# Patient Record
Sex: MALE | Race: WHITE | NOT HISPANIC OR LATINO
[De-identification: names, ages, dates, MRNs, and addresses within clinical notes are randomized per-mention and may not be internally consistent; named-entity substitution may affect disease eponyms.]

---
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
## 2021-08-24 ENCOUNTER — TRANSCRIPTION ENCOUNTER (OUTPATIENT)
Age: 21
End: 2021-08-24

## 2022-01-11 ENCOUNTER — TRANSCRIPTION ENCOUNTER (OUTPATIENT)
Age: 22
End: 2022-01-11

## 2022-01-12 ENCOUNTER — OUTPATIENT (OUTPATIENT)
Dept: OUTPATIENT SERVICES | Facility: HOSPITAL | Age: 22
LOS: 1 days | Discharge: HOME | End: 2022-01-12

## 2022-01-12 DEVICE — ANCHOR GUIDE Q FIX 1.8 MINI: Type: IMPLANTABLE DEVICE | Site: RIGHT | Status: FUNCTIONAL

## 2022-01-12 DEVICE — IMP Q FIX 1.8 MINI: Type: IMPLANTABLE DEVICE | Site: RIGHT | Status: FUNCTIONAL

## 2022-02-25 ENCOUNTER — OUTPATIENT (OUTPATIENT)
Dept: OUTPATIENT SERVICES | Facility: HOSPITAL | Age: 22
LOS: 1 days | End: 2022-02-25
Payer: COMMERCIAL

## 2022-02-25 PROCEDURE — 73501 X-RAY EXAM HIP UNI 1 VIEW: CPT | Mod: 26,RT

## 2022-02-25 PROCEDURE — 73501 X-RAY EXAM HIP UNI 1 VIEW: CPT

## 2022-06-16 ENCOUNTER — NON-APPOINTMENT (OUTPATIENT)
Age: 22
End: 2022-06-16

## 2022-12-15 ENCOUNTER — APPOINTMENT (OUTPATIENT)
Dept: ORTHOPEDIC SURGERY | Facility: CLINIC | Age: 22
End: 2022-12-15

## 2022-12-15 VITALS — BODY MASS INDEX: 23.52 KG/M2 | HEIGHT: 71 IN | RESPIRATION RATE: 16 BRPM | WEIGHT: 168 LBS

## 2022-12-15 DIAGNOSIS — Z78.9 OTHER SPECIFIED HEALTH STATUS: ICD-10-CM

## 2022-12-15 DIAGNOSIS — S69.82XD OTHER SPECIFIED INJURIES OF LEFT WRIST, HAND AND FINGER(S), SUBSEQUENT ENCOUNTER: ICD-10-CM

## 2022-12-15 PROBLEM — Z00.00 ENCOUNTER FOR PREVENTIVE HEALTH EXAMINATION: Status: ACTIVE | Noted: 2022-12-15

## 2022-12-15 PROCEDURE — 73110 X-RAY EXAM OF WRIST: CPT | Mod: 50

## 2022-12-15 PROCEDURE — 99205 OFFICE O/P NEW HI 60 MIN: CPT

## 2022-12-15 PROCEDURE — 76882 US LMTD JT/FCL EVL NVASC XTR: CPT

## 2023-03-20 ENCOUNTER — NON-APPOINTMENT (OUTPATIENT)
Age: 23
End: 2023-03-20

## 2023-07-28 ENCOUNTER — RESULT REVIEW (OUTPATIENT)
Age: 23
End: 2023-07-28

## 2023-07-28 ENCOUNTER — OUTPATIENT (OUTPATIENT)
Dept: OUTPATIENT SERVICES | Facility: HOSPITAL | Age: 23
LOS: 1 days | End: 2023-07-28
Payer: COMMERCIAL

## 2023-07-28 ENCOUNTER — APPOINTMENT (OUTPATIENT)
Dept: ORTHOPEDIC SURGERY | Facility: CLINIC | Age: 23
End: 2023-07-28
Payer: COMMERCIAL

## 2023-07-28 VITALS
HEART RATE: 58 BPM | HEIGHT: 71 IN | DIASTOLIC BLOOD PRESSURE: 70 MMHG | SYSTOLIC BLOOD PRESSURE: 115 MMHG | WEIGHT: 165 LBS | BODY MASS INDEX: 23.1 KG/M2 | OXYGEN SATURATION: 99 %

## 2023-07-28 DIAGNOSIS — M25.862 OTHER SPECIFIED JOINT DISORDERS, LEFT KNEE: ICD-10-CM

## 2023-07-28 DIAGNOSIS — Z78.9 OTHER SPECIFIED HEALTH STATUS: ICD-10-CM

## 2023-07-28 DIAGNOSIS — M76.32 ILIOTIBIAL BAND SYNDROME, LEFT LEG: ICD-10-CM

## 2023-07-28 PROCEDURE — 73564 X-RAY EXAM KNEE 4 OR MORE: CPT

## 2023-07-28 PROCEDURE — 73564 X-RAY EXAM KNEE 4 OR MORE: CPT | Mod: 26,50

## 2023-07-28 PROCEDURE — 99213 OFFICE O/P EST LOW 20 MIN: CPT

## 2023-07-28 NOTE — ASSESSMENT
[FreeTextEntry1] : JOSH DELAROSA is a 22 year old male with left knee pain.\par I discussed with the patient that their symptoms, signs, and imaging are most consistent with ITB tightness, patellofemoral dysfunction.\par We reviewed the natural history of this condition and treatment options.\par We agreed on the following plan:\par \par XR taken and reviewed with patient today.\par Activity modification: low impact aerobic activity (stationary bike, elliptical, swimming)\par Recommend 150 min per week of moderate intensity aerobic activity \par Start Home Exercises for patellofemoral and ITB conditioning. Demonstration, resistance bands and handout provided. \par Emphasized importance of daily stretching.\par Physical therapy. Referral provided.\par Medication: Diclofenac gel prn.\par Advanced imaging: MRI per patient request to evaluate for possible meniscus tear.\par Follow up after imaging.

## 2023-07-28 NOTE — HISTORY OF PRESENT ILLNESS
[de-identified] : JOSH DELAROSA is a 22 year old male who presents with left knee pain.\par States the onset of pain was spprox 1 month\par No antecedent trauma or injury.\par Started Muy [place] and Harshilu Harshilraphaelu Mar 2023 does recall certain maneuvers that may have twisted his knee over a few weeks.\par Has been having feeling of instability and pain  \par Hx of right hip SHANNON correction and labral repair in Jan 2022\par Pain is anterior described as burning\par Pain is nonradiating.\par There is no associated swelling, stiffness, numbness, paraesthesia or weakness.\par Exacerbating factors are deep squatting and twisting\par Patient ambulates independently.\par Exercise: running, martial arts, soccer\par Has not tried PT\par Attends [employer] in [place]. Studying Film and Economics

## 2023-07-28 NOTE — PHYSICAL EXAM
[de-identified] : General: Well-nourished, well-developed, alert, and in no acute distress.\par Head: Normocephalic.\par Eyes: Pupils equal, extraocular muscles intact, normal sclera.\par Nose: No nasal discharge.\par Cardiovascular: Extremities are warm and well perfused. Distal pulses are symmetric bilaterally.\par Respiratory: No labored breathing.\par Extremities: Sensation is intact distally bilaterally. Distal pulses are symmetric bilaterally\par Lymphatic: No regional lymphadenopathy, no lymphedema\par Neurologic: No focal deficits\par Skin: Normal skin color, texture, and turgor\par Psychiatric: Normal affect \par \par MSK:\par Examination of [left] knee:\par \par Gait [non-antalgic]\par Neutral alignment\par [No pain] with double leg squat\par Slight valgus moment with single leg squat \par No effusion\par No erythema, hematoma or skin lesion\par Nontender to palpation: medial joint line, lateral joint line, medial patellar facet, lateral patellar facet, quad tendon, patellar tendon, pes, Gerdy's tubercle, tibial tuberosity, popliteal fossa, hamstrings, ITB \par No warmth\par No Baker's cyst palpable\par ROM: 0-[120]\par Patellar clicking\par \par Log roll negative\par Lachman negative\par Anterior drawer negative\par Posterior drawer negative\par Varus/valgus stress negative at 0 and 30 deg\par Charlie  negative\par Patellar grind negative\par Noble negative\par Juan Ramon positive\par Thessaly negative\par \par Examination of [right] knee:\par \par No effusion, erythema, hematoma or skin lesion\par Nontender to palpation: medial joint line, lateral joint line, medial patellar facet, lateral patellar facet, quad tendon, patellar tendon, pes, Gerdy's tubercle, tibial tuberosity, popliteal fossa, hamstrings, ITB \par No warmth\par No Baker's cyst palpable\par ROM: 0-120\par [No] patellar crepitus\par \par Log roll negative\par Lachman negative\par Anterior drawer negative\par Posterior drawer negative\par Varus/valgus stress negative at 0 and 30 deg\par Charlie  negative\par Patellar grind negative \par \par Sensation is intact to light touch over the superficial and deep peroneal nerve distributions and the posterior tibial nerve distribution. Capillary refill is less than two seconds. Posterior tibial and dorsalis pedis pulses 2+ equal bilaterally. No calf swelling or tenderness bilaterally. Strength testing shows  Hip flexion 5/5, Hip abduction 5/5, Hip abduction 5/5, Knee Extension 5/5, Knee Flexion 5/5, dorsiflexion 5/5, plantar flexion 5/5, EHL 5/5\par Reflexes: Patellar 2+, Achilles 2+\par   [de-identified] : XR bilateral knee (7/28/23): There is no evidence of fracture or dislocation. The joint spaces are preserved.

## 2023-08-11 ENCOUNTER — APPOINTMENT (OUTPATIENT)
Dept: ORTHOPEDIC SURGERY | Facility: CLINIC | Age: 23
End: 2023-08-11
Payer: COMMERCIAL

## 2023-08-11 VITALS — SYSTOLIC BLOOD PRESSURE: 115 MMHG | OXYGEN SATURATION: 97 % | DIASTOLIC BLOOD PRESSURE: 69 MMHG | HEART RATE: 64 BPM

## 2023-08-11 DIAGNOSIS — M76.52 PATELLAR TENDINITIS, LEFT KNEE: ICD-10-CM

## 2023-08-11 PROCEDURE — 99213 OFFICE O/P EST LOW 20 MIN: CPT

## 2023-08-12 NOTE — PHYSICAL EXAM
[de-identified] : General: Well-nourished, well-developed, alert, and in no acute distress. Head: Normocephalic. Eyes: Pupils equal, extraocular muscles intact, normal sclera. Nose: No nasal discharge. Cardiovascular: Extremities are warm and well perfused. Distal pulses are symmetric bilaterally. Respiratory: No labored breathing. Extremities: Sensation is intact distally bilaterally. Distal pulses are symmetric bilaterally Lymphatic: No regional lymphadenopathy, no lymphedema Neurologic: No focal deficits Skin: Normal skin color, texture, and turgor Psychiatric: Normal affect   MSK: Examination of [left] knee:  Gait [non-antalgic] No effusion No erythema, hematoma or skin lesion Tender to palpation: patellar tendon, Hoffa's fat pad, tibial tuberosity Nontender to palpation: medial joint line, lateral joint line, medial patellar facet, lateral patellar facet, quad tendon,  pes, Gerdy's tubercle,  popliteal fossa, hamstrings, ITB  No warmth No Baker's cyst palpable ROM: 0-[120] [No] patellar crepitus  Log roll negative Lachman negative Anterior drawer negative Posterior drawer negative Varus/valgus stress negative at 0 and 30 deg Charlie  negative Patellar grind negative Noble negative Juan Ramon negative Thessaly negative  Examination of [right] knee:  No effusion, erythema, hematoma or skin lesion Nontender to palpation: medial joint line, lateral joint line, medial patellar facet, lateral patellar facet, quad tendon, patellar tendon, pes, Gerdy's tubercle, tibial tuberosity, popliteal fossa, hamstrings, ITB  No warmth No Baker's cyst palpable ROM: 0-120 [No] patellar crepitus  Log roll negative Lachman negative Anterior drawer negative Posterior drawer negative Varus/valgus stress negative at 0 and 30 deg Charlie  negative Patellar grind negative  Noble negative Juan Ramon negative Thessaly negative  Sensation is intact to light touch over the superficial and deep peroneal nerve distributions and the posterior tibial nerve distribution. Capillary refill is less than two seconds. Posterior tibial and dorsalis pedis pulses 2+ equal bilaterally. No calf swelling or tenderness bilaterally. Strength testing shows  Hip flexion 5/5, Hip abduction 5/5, Hip abduction 5/5, Knee Extension 5/5, Knee Flexion 5/5, dorsiflexion 5/5, plantar flexion 5/5, EHL 5/5 Reflexes: Patellar 2+, Achilles 2+   [de-identified] : MRI left knee (8/4/23):  No fracture or osteonecrosis.  Intact ACL and PCL  Intact medial and lateral meniscii. No chondral thinning. No effusion or synovitis. Trace fluid in a popliteal cyst.

## 2023-08-12 NOTE — ASSESSMENT
[FreeTextEntry1] : JOSH DELAROSA is a 22 year old male with left knee pain.     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 We reviewed the natural history of this condition and treatment options. We agreed on the following plan:  Encouraged to continue home exercises per handout. Continue physical therapy. New referral provided. Low impact physical activity. Gradual return to higher impact activity like Yanci Varela Medication: Diclofenac gel Follow up in 3-4 weeks or as needed.

## 2024-01-07 NOTE — HISTORY OF PRESENT ILLNESS
[de-identified] : JOSH DELAROSA is a 22 year old male presents to follow up left knee pain.  Last visit was 7/27/23 at which time patient was referred for MRI, advised to start home exercises, PT and use Diclofenac gel prn. MRI was unremarkable. States pain has improved. Feels some pain over tibial tuberosity.  
CHEST DISCOMFORT

## 2024-06-28 ENCOUNTER — APPOINTMENT (OUTPATIENT)
Dept: NEUROLOGY | Facility: CLINIC | Age: 24
End: 2024-06-28

## 2024-08-21 ENCOUNTER — APPOINTMENT (OUTPATIENT)
Dept: ORTHOPEDIC SURGERY | Facility: CLINIC | Age: 24
End: 2024-08-21

## 2024-08-21 VITALS
TEMPERATURE: 97.5 F | SYSTOLIC BLOOD PRESSURE: 90 MMHG | HEART RATE: 93 BPM | BODY MASS INDEX: 23.8 KG/M2 | WEIGHT: 170 LBS | HEIGHT: 71 IN | OXYGEN SATURATION: 99 % | DIASTOLIC BLOOD PRESSURE: 58 MMHG

## 2024-08-21 DIAGNOSIS — M54.50 LOW BACK PAIN, UNSPECIFIED: ICD-10-CM

## 2024-08-21 DIAGNOSIS — G89.29 LOW BACK PAIN, UNSPECIFIED: ICD-10-CM

## 2024-08-21 DIAGNOSIS — M54.9 DORSALGIA, UNSPECIFIED: ICD-10-CM

## 2024-08-21 PROCEDURE — 99204 OFFICE O/P NEW MOD 45 MIN: CPT

## 2024-08-21 PROCEDURE — 72083 X-RAY EXAM ENTIRE SPI 4/5 VW: CPT

## 2024-08-21 NOTE — DISCUSSION/SUMMARY
[de-identified] : Discussed my findings with the patient and his father. Due to chronicity of his pain, which has been limiting his physical activities, I am recommending an updated MRI lumbar spine without contrast and an order was given. Prior MRI imaging was performed prior to onset of low back pain 5/2024. He will follow up with me after imaging has been completed, sooner if there is an issue. All questions answered.

## 2024-08-21 NOTE — PHYSICAL EXAM
[de-identified] : General: patient is well developed, well nourished, in no acute  distress, alert and oriented x 3.   Mood and affect: normal  Respiratory: no respiratory distress noted  Skin: no scars over spine, skin intact, no erythema, increased warmth  Alignment:The spine is well compensated in the coronal and sagittal plane.  There is no asymmetry on the cerrato forward bend test  Gait: The patient is able to toe walk and heel walk without difficulty. The patient is able to tandem gait without difficulty.  Palpation: no tenderness to palpation spine or paraspinal region  Range of motion: Lumbar spine ROM is full  Neurologic Exam: Motor: Manual Muscle testing in the lower extremities is 5 out of 5 in all muscle groups. There is no evidence of muscular atrophy in the lower extremities. Sensory: Sensation to light touch is grossly intact in the lower extremities  Hip Exam: No pain with internal or external rotation of hips bilaterally  Special tests: Straight leg raise test negative.  Cross straight leg test negative.    [de-identified] : MRI lumbar spine without contrast 2/22/24 (my read): no disc herniation, no stenosis   XR full spine ap/lateral, lumbar flexion/extension 8/21/24 (my read): vertebral body and disc space heights well preserved, no spondylolisthesis or dynamic instability, no scoliosis, no acute fractures seen

## 2024-08-21 NOTE — END OF VISIT
[FreeTextEntry3] :   This note was written by Arabella Dejesus PA-C, acting as a scribe for Dr. Phillip Thibodeaux. I, Dr. Phillip Thibodeaux, have read and attest that all the information, medical decision-making, and discharge instructions within are true and accurate.  I, Dr. Phillip Thibodeaux, personally performed the evaluation and management (E/M) services for this new patient. That E/M includes conducting the initial examination, assessing all conditions, and establishing the plan of care. Today, my ACP, Arabella Dejesus PA-C, was here to observe my evaluation and management services for this patient to be followed going forward.

## 2024-08-21 NOTE — HISTORY OF PRESENT ILLNESS
[de-identified] : Referred by Dr. Romero  Mr. DELAROSA is a very pleasant 23 year old male who complains of episodes of back pain over the past year. First episode onset fall 2023 after working out in the gym. Reports he felt a "pop" and sudden onset of mid back pain. Pain resolved several months later after chiropractic manipulation. Patient returned to the gym without issue for several months. In May 2024, he began to feel onset of low back pain/stiffness, onset after deadlifting > 300lb. No radiation of pain to lower extremities, no lower extremity numbness/weakness/tingling. He has done home exercises/stretching with intermittent relief. Patient is very active in the gym and practicing andrea agarwal.  The patient no history of previous spine surgery.  The patient has no history of unexpected weight loss, no history of active cancer, no history bladder or bowel dysfunction, no night pain, no fevers or chills.  The past medical history, surgical history, family history, allergies, medications, 10+ point review of systems, family history and social history were reviewed and non contributory.

## 2024-08-27 ENCOUNTER — APPOINTMENT (OUTPATIENT)
Dept: MRI IMAGING | Facility: CLINIC | Age: 24
End: 2024-08-27
Payer: COMMERCIAL

## 2024-08-27 PROCEDURE — 72148 MRI LUMBAR SPINE W/O DYE: CPT

## 2024-09-09 ENCOUNTER — APPOINTMENT (OUTPATIENT)
Dept: ORTHOPEDIC SURGERY | Facility: CLINIC | Age: 24
End: 2024-09-09
Payer: COMMERCIAL

## 2024-09-09 PROCEDURE — 99443: CPT

## 2024-10-30 ENCOUNTER — TRANSCRIPTION ENCOUNTER (OUTPATIENT)
Age: 24
End: 2024-10-30

## (undated) DEVICE — DRAPE TOP SHEET 53" X 101"

## (undated) DEVICE — SHAVER BLADE S&N FULL RADIUS 4.5MM LONG CURVED (YELLOW)

## (undated) DEVICE — ARTHREX FINGERSHIELD

## (undated) DEVICE — DRILL BIT S&N QFIX FLEX 1.8MM

## (undated) DEVICE — BLADE STRYKER PIVOT SAMURAI FULL RADIUS

## (undated) DEVICE — SUT NYLON 3-0 18" PS-2

## (undated) DEVICE — TUBING SET S&N A127 PUMP STERILE

## (undated) DEVICE — PREP CHLORAPREP HI-LITE ORANGE 26ML

## (undated) DEVICE — GLV 7.5 PROTEXIS (WHITE)

## (undated) DEVICE — S&N WAND AMBIENT HIPVAC 50 DEGREE IFS 4.6MM

## (undated) DEVICE — DRAPE TOWEL BLUE 17" X 24"

## (undated) DEVICE — S&N HIP SPINALS

## (undated) DEVICE — MARKING PEN W RULER

## (undated) DEVICE — PROBE ABLATOR EFLEX  CBL

## (undated) DEVICE — SHAVER BLADE S&N FULL RADIUS BONE CUTTER 4.5MM (CLEAR)

## (undated) DEVICE — CANNULA PIVOT MEDICAL FLOWPORT II KIT W OBTURATOR

## (undated) DEVICE — SLV COMPRESSION KNEE MED

## (undated) DEVICE — CANNULA FLOW PORT II 165MM

## (undated) DEVICE — CANNULA S&N CLEAR-TRAC HIP 7 X 110MM

## (undated) DEVICE — PACK HIP ARTHROSCOPY

## (undated) DEVICE — SUT PASSER ARTHROCARE ACCU-PASS DIRECT CRESCENT XL

## (undated) DEVICE — PROBE LIGAMENT EFLEX CHISEL CBL

## (undated) DEVICE — WARMING BLANKET UPPER ADULT

## (undated) DEVICE — BUR S&N ABRADER LONG 5.5MM (BLACK)

## (undated) DEVICE — TUBING SUCTION NONCONDUCTIVE 6MM X 12FT

## (undated) DEVICE — SHAVER BLADE S&N FULL RADIUS 4.5MM LONG STRAIGHT (YELLOW)

## (undated) DEVICE — BUR S&N ABRADER LONG 4MM (PURPLE)

## (undated) DEVICE — LINER TRACTION BOOT S&N